# Patient Record
Sex: MALE | Race: WHITE | NOT HISPANIC OR LATINO | Employment: OTHER | URBAN - METROPOLITAN AREA
[De-identification: names, ages, dates, MRNs, and addresses within clinical notes are randomized per-mention and may not be internally consistent; named-entity substitution may affect disease eponyms.]

---

## 2021-02-11 ENCOUNTER — HOSPITAL ENCOUNTER (EMERGENCY)
Facility: CLINIC | Age: 61
Discharge: HOME | End: 2021-02-11
Attending: EMERGENCY MEDICINE | Admitting: EMERGENCY MEDICINE

## 2021-02-11 VITALS
SYSTOLIC BLOOD PRESSURE: 163 MMHG | HEART RATE: 83 BPM | DIASTOLIC BLOOD PRESSURE: 98 MMHG | RESPIRATION RATE: 18 BRPM | BODY MASS INDEX: 39.1 KG/M2 | HEIGHT: 68 IN | OXYGEN SATURATION: 98 % | WEIGHT: 258 LBS | TEMPERATURE: 97.3 F

## 2021-02-11 DIAGNOSIS — L03.115 CELLULITIS OF RIGHT LOWER EXTREMITY: ICD-10-CM

## 2021-02-11 DIAGNOSIS — S90.454A FOREIGN BODY OF TOE OF RIGHT FOOT, INITIAL ENCOUNTER: ICD-10-CM

## 2021-02-11 PROCEDURE — 99283 EMERGENCY DEPT VISIT LOW MDM: CPT | Mod: 25

## 2021-02-11 PROCEDURE — 10120 INC&RMVL FB SUBQ TISS SMPL: CPT

## 2021-02-11 RX ORDER — CEPHALEXIN 500 MG/1
500 CAPSULE ORAL 4 TIMES DAILY
Qty: 28 CAPSULE | Refills: 0 | Status: SHIPPED | OUTPATIENT
Start: 2021-02-11 | End: 2021-02-18

## 2021-02-11 ASSESSMENT — ENCOUNTER SYMPTOMS: WOUND: 1

## 2021-02-11 ASSESSMENT — MIFFLIN-ST. JEOR: SCORE: 1959.78

## 2021-02-11 NOTE — ED TRIAGE NOTES
[facility]  ED Arrival Note    Arrives through triage. A &O X4. ABC's intact. Pt arrives with c/o a wood splint in his R toe. Reports that he wants it removed as the pain is has been getting worst overnight.       Triage Interventions: N/A    Ambulatory: Yes    Directed to: Main ED

## 2021-02-11 NOTE — ED PROVIDER NOTES
"  History   Chief Complaint:  Toe Pain       The history is provided by the patient.      Raquel Whitehead is a 61 year old male with history of hyperlipidemia who presents for evaluation of right 3rd toe pain starting a couple days ago after having a wood splinter in his toe. The patient states that he was walking on a client's rough wood floor without his shoes when he felt a sharp pain and noticed a large wooden splinter. He notes that he was able to pull a majority of it out but states that he has been unable to get the rest out. He states that he can feel the splinter when he runs over his toe with his finger. He presents today requesting removal. Per chart review, the patient's last tetanus booster was in 2004.      Review of Systems   Skin: Positive for wound (foreign body in right 3rd toe).   All other systems reviewed and are negative.        Allergies:  Amoxicillin    Medications:  Aspirin 81mg    Past Medical History:    Hyperlipidemia  Obesity   Vertigo    Past Surgical History:    Kidney biopsy  Lasik keratotomy  left inguinal herniorraphy     Family History:    CAD  Hypertension  Prostate cancer  Glaucoma  Hyperlipidemia  Stroke  Diabetes  Breast cancer  Lymphoma    Social History:  The patient presents to the emergency department alone      Physical Exam     Patient Vitals for the past 24 hrs:   BP Temp Temp src Pulse Resp SpO2 Height Weight   02/11/21 1716 (!) 163/98 97.3  F (36.3  C) Temporal 83 18 98 % 1.727 m (5' 8\") 117 kg (258 lb)       Physical Exam  GENERAL: well developed, pleasant  HEAD: atraumatic  EYES: pupils reactive, extraocular muscles intact, conjunctivae normal  ENT:  mucus membranes moist  NECK:  trachea midline, normal range of motion  MUSCULOSKELETAL: no deformities  SKIN: warm and dry, Dark spot next to left 3rd toe nail with some surrounding erythema to the toe  NEURO: GCS 15, cranial nerves intact, alert and oriented x3  PSYCH:  Mood/affect normal      Emergency Department " Course     Procedures     Foreign Body Removal     LOCATION:  Right third toe    FUNCTION:  Distally sensation, circulation, motor and tendon function are intact.       ANESTHESIA:  None    PREPARATION:  Irrigation and Scrubbing with Normal Saline and Betadine.     DEBRIDEMENT:  None      PROCEDURE:  Using a splinter removal kit I successfully was able to remove the patient's foreign body. The patient tolerated the procedure well.       Emergency Department Course:    Reviewed:  1725: I reviewed nursing notes, vitals, past medical history and care everywhere    Assessments:  1730: I assessed the patient and performed a physical exam at this time.   1745: I removed the foreign body as noted above. At this point I feel that the patient is safe for discharge, and the patient agrees.     Disposition:  The patient was discharged to home.     Impression & Plan     Medical Decision Making:  Presents with wood splinter in his toe that he is tried to remove but has been unsuccessful.  He has some mild erythema to the toe.  I was able to remove what looked to be in entirety of what looked to be most be a toothpick of sorts.  Did have some pus that was removed along with this.  We will put him on Keflex and have him soak his toe but cautioned him that he may need Levaquin given the foot problem but did not want to run the risk of tendon injury and feel that removal of the foreign body soaking largely should take care of this but cautioned him if it seems to be getting worse he will need to return.    Diagnosis:    ICD-10-CM    1. Foreign body of toe of right foot, initial encounter  S90.454A    2. Cellulitis of right lower extremity  L03.115        Discharge Medications:  Discharge Medication List as of 2/11/2021  5:56 PM      START taking these medications    Details   cephALEXin (KEFLEX) 500 MG capsule Take 1 capsule (500 mg) by mouth 4 times daily for 7 days, Disp-28 capsule, R-0, Local Print             Scribe  Disclosure:  I, Nathanael Clark, am serving as a scribe at 5:25 PM on 2/11/2021 to document services personally performed by Napoleon Shields MD based on my observations and the provider's statements to me.        Napoleon Shields MD  02/11/21 2127

## 2022-06-16 ENCOUNTER — OFFICE VISIT (OUTPATIENT)
Dept: URGENT CARE | Facility: URGENT CARE | Age: 62
End: 2022-06-16
Payer: COMMERCIAL

## 2022-06-16 VITALS — WEIGHT: 282 LBS | BODY MASS INDEX: 42.88 KG/M2

## 2022-06-16 DIAGNOSIS — L08.9: Primary | ICD-10-CM

## 2022-06-16 DIAGNOSIS — E66.01 MORBID OBESITY (H): ICD-10-CM

## 2022-06-16 DIAGNOSIS — S61.412S: Primary | ICD-10-CM

## 2022-06-16 DIAGNOSIS — Z48.02 VISIT FOR SUTURE REMOVAL: ICD-10-CM

## 2022-06-16 PROCEDURE — 99203 OFFICE O/P NEW LOW 30 MIN: CPT | Performed by: NURSE PRACTITIONER

## 2022-06-16 RX ORDER — SULFAMETHOXAZOLE/TRIMETHOPRIM 800-160 MG
1 TABLET ORAL 2 TIMES DAILY
Qty: 10 TABLET | Refills: 0 | Status: SHIPPED | OUTPATIENT
Start: 2022-06-16 | End: 2022-06-26

## 2022-06-16 RX ORDER — ATORVASTATIN CALCIUM 40 MG/1
1 TABLET, FILM COATED ORAL DAILY
COMMUNITY
Start: 2021-06-28

## 2022-06-16 RX ORDER — COLCHICINE 0.6 MG/1
0.6 CAPSULE ORAL
COMMUNITY
Start: 2021-11-05

## 2022-06-16 RX ORDER — MECLIZINE HYDROCHLORIDE 25 MG/1
25 TABLET ORAL
COMMUNITY
Start: 2020-07-02

## 2022-06-16 RX ORDER — ALLOPURINOL 300 MG/1
1 TABLET ORAL DAILY
COMMUNITY
Start: 2021-11-05

## 2022-06-16 NOTE — PROGRESS NOTES
Chief Complaint   Patient presents with     Suture Removal     Pt had sutures put in 10 days ago in ER in L hand. Hand is swollen still          ICD-10-CM    1. Laceration of hand with infection, left, sequela  S61.412S sulfamethoxazole-trimethoprim (BACTRIM DS) 800-160 MG tablet    L08.9    2. Morbid obesity (H)  E66.01    3. Visit for suture removal  Z48.02      Instructed patient he must keep the area clean and dry changing the dressing multiple times a day if necessary if they become wet.  We will start him on antibiotic as wound infection is present and have him stop topical bacitracin in attempt to allow the wound to dry out.  He is aware part of 1 suture remains underneath the skin and that this will likely work its way out on its own.    Subjective     Raquel Whitehead is an 62 year old male who presents to clinic today for suture removal from the left hand.  Stitches were placed in [facility] emergency room 11 days ago.  He presents today to have them removed.  He denies any fever or chills but does have redness around the wound that is concerning.  He has still been applying bacitracin daily followed by a glove while working.  He works as a  and reports the glove does get very sweaty every day.    Objective    Wt 127.9 kg (282 lb)   BMI 42.88 kg/m    Nurses notes and VS have been reviewed.    Physical Exam       GENERAL APPEARANCE: healthy appearing, alert     MS: extremities normal- no gross deformities noted; normal muscle tone.     SKIN: 3 inch long incision over the left dorsal hand, 4 sutures are which are completely embedded underneath the suture line, erythema extends 1.5 cm in all directions and area is swollen     NEURO: Normal strength and tone, mentation intact and speech normal     PSYCH: normal thought process; no significant mood disturbance    Removed 3 of the 4 sutures with some difficulty and part of the fourth suture was removed but part was unable to be retrieved from under  the skin.    Patient Instructions   Take antibiotics until gone.    No more antibiotics ointment just keep covered with dry bandage when working.      URBAN Parrish, CNP  [facility] Provider    The use of Dragon/[employer] dictation services may have been used to construct the content in this note; any grammatical or spelling errors are non-intentional. Please contact the author of this note directly if you are in need of any clarification.

## 2022-06-16 NOTE — PATIENT INSTRUCTIONS
Take antibiotics until gone.    No more antibiotics ointment just keep covered with dry bandage when working.